# Patient Record
Sex: FEMALE
[De-identification: names, ages, dates, MRNs, and addresses within clinical notes are randomized per-mention and may not be internally consistent; named-entity substitution may affect disease eponyms.]

---

## 2021-06-10 ENCOUNTER — HOSPITAL ENCOUNTER (OUTPATIENT)
Dept: HOSPITAL 97 - 2ND | Age: 46
Setting detail: OBSERVATION
LOS: 1 days | Discharge: HOME | End: 2021-06-11
Attending: SURGERY | Admitting: SURGERY
Payer: COMMERCIAL

## 2021-06-10 VITALS — BODY MASS INDEX: 39.6 KG/M2

## 2021-06-10 DIAGNOSIS — K35.80: Primary | ICD-10-CM

## 2021-06-10 PROCEDURE — 88304 TISSUE EXAM BY PATHOLOGIST: CPT

## 2021-06-10 PROCEDURE — 0DTJ4ZZ RESECTION OF APPENDIX, PERCUTANEOUS ENDOSCOPIC APPROACH: ICD-10-PCS

## 2021-06-10 PROCEDURE — 44970 LAPAROSCOPY APPENDECTOMY: CPT

## 2021-06-10 RX ADMIN — MORPHINE SULFATE PRN MG: 4 INJECTION, SOLUTION INTRAMUSCULAR; INTRAVENOUS at 18:48

## 2021-06-10 RX ADMIN — SODIUM CHLORIDE, SODIUM LACTATE, POTASSIUM CHLORIDE, AND CALCIUM CHLORIDE SCH MLS: .6; .31; .03; .02 INJECTION, SOLUTION INTRAVENOUS at 18:48

## 2021-06-10 RX ADMIN — HYDROCODONE BITARTRATE AND ACETAMINOPHEN PRN TAB: 7.5; 325 TABLET ORAL at 21:14

## 2021-06-10 NOTE — P.OP
Preoperative diagnosis: Acute abdomen


Postoperative diagnosis: Acute appendicitis


Primary procedure: Laparoscopic appendectomy


Anesthesia: General


Estimated blood loss: Less than 10 cc


Specimen: 1 appendix


Operative Technique: 





The patient brought the operating room placed supine on the table. After the 

induction of adequate general endotracheal anesthesia, the area of the abdomen 

was prepped with a DuraPrep solution, and she was draped in usual aseptic 

manner.





A subumbilical incision was made. This was brought down through the skin and 

subcutaneous tissue. The Visiport was now used to enter the peritoneal cavity 

and created pneumoperitoneum to approximately 12 mm of mercury.  Under direct 

vision a 5 mm trocar was placed in the right upper quadrant. Attention was 

turned down towards the pelvis.  There were some adhesions from her previous C-

sections as well as the uterus adherent to the midline went approximately 4 

fingers breath below the emboli kiss well we placed our lower midline trocar. We

were now able to visualize the right lower quadrant. The patient was placed in 

Trendelenburg and rolled to the left. Could visualize right upper quadrant. 

Could see that there was inflammatory process in the area just above the true 

pelvis.  Gentle dissection lattice to take parts of the distal ileum which were 

adherent to this area. These came down quite easily.  We were able to gently 

dissect into that portion where we found another layer of the ileum over an 

inflammatory process.  Once again it was picked off and we discover the true 

appendix.  It was a markedly adherent to the sidewall of the true pelvis.  The 

ovary was in his close vicinity.  The tip of the appendix was gently dissected 

out of this area of inflammation.  We were able to trace the appendix back 

towards the cecum.  The cecum was now identified. We were able to elevate up the

vasculature of the appendix.  Gentle dissection lattice expose the main vessels.

Using a linear Stapler with with a vascular load we came across the blood supply

to the appendix.  This allowed us to identify the junction of the appendix with 

the cecum.  An opening was made into the mesentery in this area. Using a linear 

Stapler we will increase placed across the base of the appendix and fired.  The 

appendix now having been detached was placed into an Endo-Catch and brought out 

through the umbilical trocar site. Attention was turned back towards the right 

lower quadrant. Once again we inspected the area to ensure adequate hemostasis. 

Small blood blood clots were extracted using the suction device. The abdomen was

inspected to assure adequate hemostasis. This having mean down the umbilical I 

kiss was approximated using 2 sutures placed using the Endo Close.  The suit the

patient was returned to the neutral position on the OR table. The 

pneumoperitoneum was now collapsed, the sutures tied, and the trocars removed. 

Staples were applied to the skin.  At the end of procedure she was stable when 

sent to the recovery room. Needle sponge instrument count were correct. No 

drains were placed.


Complications: None


Transferred to: Recovery Room


Condition: Good

## 2021-06-10 NOTE — P.HP
Date of Service: 06/10/21


PC:  This 45-year-old female presents to an outpatient ER with right lower 

quadrant abdominal pain for diagnosis and treatment.





HPC:  Patient apparently has been having abdominal pain since Saturday.  Began 

shortly after she ate.  Was located in the upper portion of her abdomen, but 

over the last day or so has localized to the right lower quadrant. Hurts when 

she tries to lift or bend





PMH:   5 para 5





PSHx:  Negative





SOC:  No known allergies has not on any medications





SYS REVIEW:  No cough, wheeze, shortness of breath.  No chest pain or 

palpitations.  Denies any urinary complaints





O/E awake alert vital signs are stable








HEENT:  Nonicteric





Chest:  Air entry equal bilateral





ABD:  Tender in the right lower quadrant





LOCO:  Intact





DATA:  Has acute appendicitis on CT scan





IMPRESSION:  Acute abdomen with appendicitis





PLAN:


I will take her to the operating room for laparoscopic possible open 

appendectomy.  The risks of this procedure have been discussed.  The possibility

of bleeding, infection, injury to bile ducts blood vessels intestines has been 

described.  The possible need for an open and/or further surgeries and 

procedures was discussed.  She understands and wants to proceed.

## 2021-06-11 VITALS — SYSTOLIC BLOOD PRESSURE: 126 MMHG | TEMPERATURE: 97.1 F | DIASTOLIC BLOOD PRESSURE: 75 MMHG

## 2021-06-11 VITALS — OXYGEN SATURATION: 97 %

## 2021-06-11 RX ADMIN — MORPHINE SULFATE PRN MG: 4 INJECTION, SOLUTION INTRAMUSCULAR; INTRAVENOUS at 08:18

## 2021-06-11 RX ADMIN — HYDROCODONE BITARTRATE AND ACETAMINOPHEN PRN TAB: 7.5; 325 TABLET ORAL at 04:56

## 2021-06-11 RX ADMIN — SODIUM CHLORIDE, SODIUM LACTATE, POTASSIUM CHLORIDE, AND CALCIUM CHLORIDE SCH MLS: .6; .31; .03; .02 INJECTION, SOLUTION INTRAVENOUS at 04:56

## 2021-06-11 RX ADMIN — PIPERACILLIN SODIUM AND TAZOBACTAM SODIUM SCH MLS: 3; .375 INJECTION, POWDER, LYOPHILIZED, FOR SOLUTION INTRAVENOUS at 08:11

## 2021-06-11 RX ADMIN — PIPERACILLIN SODIUM AND TAZOBACTAM SODIUM SCH MLS: 3; .375 INJECTION, POWDER, LYOPHILIZED, FOR SOLUTION INTRAVENOUS at 00:27

## 2021-06-11 RX ADMIN — HYDROCODONE BITARTRATE AND ACETAMINOPHEN PRN TAB: 7.5; 325 TABLET ORAL at 12:23

## 2025-01-22 ENCOUNTER — HOSPITAL ENCOUNTER (INPATIENT)
Dept: HOSPITAL 97 - 4TH | Age: 50
LOS: 2 days | Discharge: HOME | DRG: 419 | End: 2025-01-24
Attending: HOSPITALIST | Admitting: FAMILY MEDICINE
Payer: COMMERCIAL

## 2025-01-22 VITALS — BODY MASS INDEX: 40.2 KG/M2

## 2025-01-22 DIAGNOSIS — K80.12: Primary | ICD-10-CM

## 2025-01-22 DIAGNOSIS — Z90.49: ICD-10-CM

## 2025-01-22 DIAGNOSIS — Z79.899: ICD-10-CM

## 2025-01-22 DIAGNOSIS — I10: ICD-10-CM

## 2025-01-22 PROCEDURE — 88304 TISSUE EXAM BY PATHOLOGIST: CPT

## 2025-01-22 PROCEDURE — 36415 COLL VENOUS BLD VENIPUNCTURE: CPT

## 2025-01-22 PROCEDURE — 94010 BREATHING CAPACITY TEST: CPT

## 2025-01-22 PROCEDURE — 81001 URINALYSIS AUTO W/SCOPE: CPT

## 2025-01-22 PROCEDURE — 85610 PROTHROMBIN TIME: CPT

## 2025-01-22 PROCEDURE — 84443 ASSAY THYROID STIM HORMONE: CPT

## 2025-01-22 PROCEDURE — 84439 ASSAY OF FREE THYROXINE: CPT

## 2025-01-22 PROCEDURE — 85025 COMPLETE CBC W/AUTO DIFF WBC: CPT

## 2025-01-22 PROCEDURE — 74300 X-RAY BILE DUCTS/PANCREAS: CPT

## 2025-01-22 PROCEDURE — 80053 COMPREHEN METABOLIC PANEL: CPT

## 2025-01-22 RX ADMIN — SODIUM CHLORIDE SCH MLS: 0.9 INJECTION, SOLUTION INTRAVENOUS at 23:30

## 2025-01-22 RX ADMIN — MORPHINE SULFATE PRN MG: 2 INJECTION, SOLUTION INTRAMUSCULAR; INTRAVENOUS at 23:30

## 2025-01-22 RX ADMIN — CEFTAZIDIME SCH GM: 100 INJECTION, POWDER, FOR SOLUTION INTRAMUSCULAR; INTRAVENOUS at 23:56

## 2025-01-22 RX ADMIN — ONDANSETRON PRN MG: 2 INJECTION INTRAMUSCULAR; INTRAVENOUS at 23:29

## 2025-01-22 RX ADMIN — DEXMEDETOMIDINE HYDROCHLORIDE ONE MLS: 100 INJECTION, SOLUTION, CONCENTRATE INTRAVENOUS at 23:56

## 2025-01-22 RX ADMIN — HYDROCODONE BITARTRATE AND ACETAMINOPHEN PRN TAB: 5; 325 TABLET ORAL at 23:29

## 2025-01-22 NOTE — P.HP
Certification for Inpatient


Patient admitted to: Observation


With expected LOS: <2 Midnights


Practitioner: I am a practitioner with admitting privileges, knowledge of 

patient current condition, hospital course, and medical plan of care.


Services: Services provided to patient in accordance with Admission requirements

found in Title 42 Section 412.3 of the Code of Federal Regulations





Patient History


Date of Service: 01/22/25


Reason for admission: Abdominal Pain 


History of Present Illness: 








48 yo female with past medical history of Hypertension who started having 

abdominal pain at 3 AM this morning.  Pain is sharp 8 out of 10 in severity 

located in the right upper quadrant with no radiation associated with some 

nausea and vomiting but no diarrhea.  Patient was also seen outside ER and had a

workup which was consistent with acute cholecystitis and was sent over here for 

further management and surgical consult.





Patient denies any fever or chills


Denies any chest pain or shortness of breath


Allergies





No Known Allergies Allergy (Verified 06/10/21 17:46)


   





Home medications list reviewed: Yes


Home Medications: 








Losartan Potassium [Cozaar] 25 mg PO DAILY 01/22/25 








- Past Medical/Surgical History


Has patient received pneumonia vaccine in the past: No


Diabetic: No


Past Medical History: Reviewed- Non-Contributory


-: hypertension


Past Surgical History: Reviewed- Non-Contributory


-: c section





- Family History


Family History: Reviewed- Non-Contributory





- Social History


Smoking Status: Never smoker


Alcohol use: No


CD- Drugs: No


Caffeine use: Yes


Place of Residence: Home





Review of Systems


10-point ROS is otherwise unremarkable





Physical Examination





- Vital Signs


Temperature: 98.7 F


Blood Pressure: 126/76


Pulse: 76


Respirations: 18


Pulse Ox (%): 94





- Physical Exam


General: Alert, Oriented x3


HEENT: Atraumatic, Normocephalic


Neck: Supple


Respiratory: Clear to auscultation bilaterally, Normal air movement


Cardiovascular: Regular rate/rhythm, Normal S1 S2


Capillary refill: <2 Seconds


Gastrointestinal: Soft and benign, W/out hepatosplenomegaly


Musculoskeletal: No clubbing, No swelling


Integumentary: No rashes


Neurological: Normal speech, Normal strength at 5/5 x4 extr, Cranial nerves 3-12

 intact, Normal reflexes 2+


Lymphatics: No axilla or inguinal lymphadenopathy





Assessment and Plan





- Plan











Acute Cholecystitis 


Pain Control 


IV antibiotics 


Surgery Consult 


Zofran PRN


IV fluids 


NPO








GI/DVT Prophylaxis 


Advance Directive  Full Code 








Discharge Plan: Home


Plan to discharge in: 48 Hours





- Advance Directives


Does patient have a Living Will: No


Does patient have a Durable POA for Healthcare: No





- Code Status/Comfort Care


Code Status: Full Code


Time Spent Managing Pts Care (In Minutes): 48

## 2025-01-23 VITALS — OXYGEN SATURATION: 95 %

## 2025-01-23 LAB
ALBUMIN SERPL BCP-MCNC: 3.3 G/DL (ref 3.4–5)
ALBUMIN/GLOB SERPL: 0.9 {RATIO} (ref 1.1–1.8)
ALP SERPL-CCNC: 83 U/L (ref 45–117)
ALT SERPL W P-5'-P-CCNC: 61 U/L (ref 13–56)
ANION GAP SERPL CALC-SCNC: 6.2 MEQ/L (ref 5–15)
AST SERPL W P-5'-P-CCNC: 39 U/L (ref 15–37)
BUN BLD-MCNC: 10 MG/DL (ref 7–18)
GLOBULIN SER CALC-MCNC: 3.6 G/DL (ref 2.3–3.5)
GLUCOSE SERPLBLD-MCNC: 130 MG/DL (ref 74–106)
HCT VFR BLD CALC: 38.6 % (ref 36–45)
HGB BLD-MCNC: 13.4 G/DL (ref 12–15)
INR BLD: 1.22
LYMPHOCYTES # SPEC AUTO: 1.5 K/UL (ref 0.7–4.9)
MCH RBC QN AUTO: 30.8 PG (ref 27–35)
MCHC RBC AUTO-ENTMCNC: 34.6 G/DL (ref 32–36)
MCV RBC: 89.2 FL (ref 80–100)
NRBC # BLD: 0 10*3/UL (ref 0–0)
NRBC BLD AUTO-RTO: 0 % (ref 0–0)
PMV BLD: 11.2 FL (ref 7.6–11.3)
POTASSIUM SERPL-SCNC: 4.2 MEQ/L (ref 3.5–5.1)
PROTHROMBIN TIME: 12.8 SECONDS (ref 9.4–12.5)
RBC # BLD: 4.34 M/UL (ref 3.86–4.86)
SQUAMOUS URNS QL MICRO: (no result) /HPF
UA COMPLETE W REFLEX CULTURE PNL UR: (no result)
UA DIPSTICK W REFLEX MICRO PNL UR: (no result)
WBC # BLD AUTO: 13.7 THOU/UL (ref 4.3–10.9)

## 2025-01-23 PROCEDURE — BF121ZZ FLUOROSCOPY OF GALLBLADDER USING LOW OSMOLAR CONTRAST: ICD-10-PCS

## 2025-01-23 PROCEDURE — 0FT44ZZ RESECTION OF GALLBLADDER, PERCUTANEOUS ENDOSCOPIC APPROACH: ICD-10-PCS

## 2025-01-23 RX ADMIN — ROCURONIUM BROMIDE ONE: 10 INJECTION, SOLUTION INTRAVENOUS at 15:06

## 2025-01-23 RX ADMIN — CEFTAZIDIME SCH MLS: 100 INJECTION, POWDER, FOR SOLUTION INTRAMUSCULAR; INTRAVENOUS at 09:08

## 2025-01-23 RX ADMIN — SODIUM CHLORIDE, SODIUM LACTATE, POTASSIUM CHLORIDE, AND CALCIUM CHLORIDE ONE MLS: .6; .31; .03; .02 INJECTION, SOLUTION INTRAVENOUS at 13:44

## 2025-01-23 RX ADMIN — INFLUENZA VIRUS VACCINE ONE: 15; 15; 15 SUSPENSION INTRAMUSCULAR at 07:45

## 2025-01-23 RX ADMIN — SODIUM CHLORIDE SCH MLS: 9 INJECTION, SOLUTION INTRAVENOUS at 17:22

## 2025-01-23 RX ADMIN — ACETAMINOPHEN PRN MG: 325 TABLET ORAL at 13:13

## 2025-01-23 NOTE — RAD REPORT
EXAM: Fluoroscopy use, Cholangiogram Oper-Xray Or



HISTORY: [facility] MAIN

 LAP RAJWINDER W/ IOC



COMPARISON: None



FINDINGS: A total of 9 images were sent to PACS, during a fluoroscopically guided intraoperative chol
angiography. No radiologist was involved in protocoling or performance of the study, and no

radiologist was present for the duration of the procedure. No interpretation of the saved images will
 be provided.



Total fluoroscopy time: 0.2 minutes.



IMPRESSION: Documentation of fluoroscopy use as above.



Reported By: Abelino Kahn 

Electronically Signed:  1/23/2025 6:21 PM

## 2025-01-23 NOTE — P.HP
Date of Service: 01/23/25





PC: This patient presented to another facility with severe right upper quadrant 

abdominal pain which revealed she had acute on chronic cholecystitis with 

cholelithiasis.  She was transferred to our facility for surgical treatment.





HPC: Patient has been experiencing right upper quadrant abdominal pain radiating

to her back for the last few months.  Last night's episode was straight in the 

middle going up into her chest.  She has been having this off and on for the 

last few years.





PSHx: Previous laparoscopic appendectomy





PMHx: Hypertension, she is not diabetic.





Social Hx: No known allergies





Sys R: No cough, wheeze, shortness of breath.  No chest pain or palpitations.  

No urinary complaints.











O/E: Awake alert vital signs are stable





HEENT: Within normal limits





Chest: Air entry equal bilaterally





Abd: Mildly tender in the right upper quadrant





Loco: Intact





Data: Has documented gallstones





Impression: This patient has acute on chronic cholecystitis with cholelithiasis.





Plan: I will taken to the operating room for laparoscopic possible open 

cholecystectomy with a cholangiogram.  The risks of this procedure have been 

discussed.  The possibility of bleeding, infection, injury to bile ducts blood 

vessels and intestines have been described.  The possible need for an open 

and/or further surgeries and procedures was discussed.  She understands and 

wants us to proceed.

## 2025-01-23 NOTE — P.OP
Preoperative diagnosis: Acute on chronic cholecystitis with cholelithiasis


Postoperative diagnosis:  the same


Primary procedure: Laparoscopic cholecystectomy


Secondary procedure: Cholangiogram under fluoroscopy


Anesthesia: General


Specimen: 1 gallbladder and contents


Operative Technique: 





The patient brought the operating room and placed supine on the table.  After 

the induction of adequate general endotracheal anesthesia, the area of the 

abdomen was prepped with a DuraPrep solution, and she was draped in the usual 

aseptic manner.





Attention was turned towards the umbilicus.  A subumbilical incision was made 

after injecting with 0.25% Marcaine.  The Visiport was now used to enter the 

peritoneal cavity and created pneumoperitoneum to approximately 12 mmHg.  The 

patient was then placed in reverse Trendelenburg and airplane to the left.  We 

could visualize the right upper quadrant.  A swollen and edematous gallbladder 

was noted.  A 5 mm trocar was placed in the upper midline another in the 2 on 

the right side of the abdomen.  We were now able to attempt to grasp the 

gallbladder.  It was only markedly distended with quite amount of edema in the 

wall and under the serosal surface.  The contents of the gallbladder were now 

aspirated.  At this point we could now place the grasper on the fundus of the 

gallbladder.  Applying lateral traction another grasper was placed down by 

Mack's pouch.  Gentle dissection of this edematous area allowed us to expose 

both the cystic duct and the artery.  A clip was placed between the gallbladder 

and the cystic duct.  An opening was made into the cystic duct through which 

were able to pass our cholangiocatheter.  Using fluoroscopy we were able to 

inject our contrast material demonstrating good flow of contrast into the 

duodenum.  We had advanced our balloon into the common bile duct so after 

pulling it back a pressure injection showed the upper radicles nicely with no 

evidence of filling defects.  The catheter was then removed.  Clips were placed 

on the distal portion of the cystic duct.  The cystic duct and artery were now 

fully transected.  The gallbladder was then dissected free from the liver bed, 

placed into an Endo Catch, and brought out through the umbilical trocar site.





Attention was turned back up towards right upper quadrant.  The gallbladder 

fossa was inspected to ensure adequate hemostasis.  The irrigating fluid was 

aspirated from the peritoneal cavity.  Attention was now turned back towards the

umbilicus.  This patient has had a previous tummy tuck as well as a previous 

appendectomy through this entry site.  2 sutures of absorbable material were 

used to approximate the fascial defects.  We could also see that she had had 

some incarcerated hernias along that way these were pulled down using the 

graspers.  It was interesting to note that she does have adhesions of her uterus

to the lower portion of her of her operative scar.





At the end of the procedure the pneumoperitoneum was collapsed, the sutures 

tied, and staples applied to the skin.  At the end of the procedure she was 

stable and sent to the recovery room.  Needle sponge instrument count were 

correct.  No drains were placed.


Complications: None


Transferred to: Recovery Room


Condition: Good

## 2025-01-24 VITALS — TEMPERATURE: 97.8 F | SYSTOLIC BLOOD PRESSURE: 122 MMHG | DIASTOLIC BLOOD PRESSURE: 65 MMHG

## 2025-01-24 LAB
ALBUMIN SERPL BCP-MCNC: 2.7 G/DL (ref 3.4–5)
ALBUMIN/GLOB SERPL: 0.7 {RATIO} (ref 1.1–1.8)
ALP SERPL-CCNC: 77 U/L (ref 45–117)
ALT SERPL W P-5'-P-CCNC: 66 U/L (ref 13–56)
ANION GAP SERPL CALC-SCNC: 8.8 MEQ/L (ref 5–15)
AST SERPL W P-5'-P-CCNC: 34 U/L (ref 15–37)
BUN BLD-MCNC: 12 MG/DL (ref 7–18)
GLOBULIN SER CALC-MCNC: 3.8 G/DL (ref 2.3–3.5)
GLUCOSE SERPLBLD-MCNC: 192 MG/DL (ref 74–106)
HCT VFR BLD CALC: 35.6 % (ref 36–45)
HGB BLD-MCNC: 12.4 G/DL (ref 12–15)
LYMPHOCYTES # SPEC AUTO: 1.4 K/UL (ref 0.7–4.9)
MCH RBC QN AUTO: 31.1 PG (ref 27–35)
MCHC RBC AUTO-ENTMCNC: 34.7 G/DL (ref 32–36)
MCV RBC: 89.6 FL (ref 80–100)
NRBC # BLD: 0 10*3/UL (ref 0–0)
NRBC BLD AUTO-RTO: 0 % (ref 0–0)
PMV BLD: 12 FL (ref 7.6–11.3)
POTASSIUM SERPL-SCNC: 3.8 MEQ/L (ref 3.5–5.1)
RBC # BLD: 3.98 M/UL (ref 3.86–4.86)
TSH SERPL DL<=0.05 MIU/L-ACNC: 0.55 UIU/ML (ref 0.36–3.74)
WBC # BLD AUTO: 13.7 THOU/UL (ref 4.3–10.9)

## 2025-01-24 RX ADMIN — INFLUENZA VIRUS VACCINE ONE: 15; 15; 15 SUSPENSION INTRAMUSCULAR at 10:31

## 2025-01-24 RX ADMIN — ENOXAPARIN SODIUM SCH MG: 40 INJECTION SUBCUTANEOUS at 08:07

## 2025-01-24 RX ADMIN — BUTALBITAL, ACETAMINOPHEN, AND CAFFEINE ONE TAB: 50; 325; 40 TABLET ORAL at 10:30

## 2025-01-24 RX ADMIN — HYDROCODONE BITARTRATE AND ACETAMINOPHEN PRN TAB: 7.5; 325 TABLET ORAL at 08:06

## 2025-01-24 NOTE — P.PN
Date of Service: 01/23/25





Subjective


10-point ROS is otherwise unremarkable





Physical Examination





- Vital Signs


reviewed





- Physical Exam


General: Alert, Oriented x3


Respiratory: Clear to auscultation bilaterally, Normal air movement


Cardiovascular: Regular rate/rhythm, Normal S1 S2


Gastrointestinal: Soft and benign, W/out hepatosplenomegaly


Musculoskeletal: No clubbing, No swelling


Neurological: Normal speech, Normal strength at 5/5 x4 extr, Cranial nerves 3-12

intact, Normal reflexes 2+





Assessment and Plan





- Assessment/Plan


1. Acute Cholecystitis 


Pain Control 


IV antibiotics 


Surgery Consult appreciated


Zofran PRN


IV fluids 


full liquid








GI/DVT Prophylaxis 


Advance Directive  Full Code 








Discharge Plan: Home


Plan to discharge in: 48 Hours





- Advance Directives


Does patient have a Living Will: No


Does patient have a Durable POA for Healthcare: No





- Code Status/Comfort Care


Code Status: Full Code


Time Spent Managing Pts Care (In Minutes): 30